# Patient Record
Sex: FEMALE | Race: WHITE | NOT HISPANIC OR LATINO
[De-identification: names, ages, dates, MRNs, and addresses within clinical notes are randomized per-mention and may not be internally consistent; named-entity substitution may affect disease eponyms.]

---

## 2017-01-11 ENCOUNTER — MEDICATION RENEWAL (OUTPATIENT)
Age: 37
End: 2017-01-11

## 2017-02-08 ENCOUNTER — APPOINTMENT (OUTPATIENT)
Dept: ENDOCRINOLOGY | Facility: CLINIC | Age: 37
End: 2017-02-08

## 2017-02-15 ENCOUNTER — RX RENEWAL (OUTPATIENT)
Age: 37
End: 2017-02-15

## 2017-03-16 ENCOUNTER — LABORATORY RESULT (OUTPATIENT)
Age: 37
End: 2017-03-16

## 2017-03-16 ENCOUNTER — APPOINTMENT (OUTPATIENT)
Dept: ENDOCRINOLOGY | Facility: CLINIC | Age: 37
End: 2017-03-16

## 2017-03-16 VITALS
HEART RATE: 80 BPM | DIASTOLIC BLOOD PRESSURE: 62 MMHG | WEIGHT: 138 LBS | BODY MASS INDEX: 25.4 KG/M2 | OXYGEN SATURATION: 97 % | HEIGHT: 62 IN | SYSTOLIC BLOOD PRESSURE: 102 MMHG

## 2017-03-16 LAB
GLUCOSE BLDC GLUCOMTR-MCNC: 155
HBA1C MFR BLD HPLC: 6.8

## 2017-03-20 LAB
25(OH)D3 SERPL-MCNC: 17.4 NG/ML
ALBUMIN SERPL ELPH-MCNC: 4.7 G/DL
ALP BLD-CCNC: 59 U/L
ALT SERPL-CCNC: 13 U/L
ANION GAP SERPL CALC-SCNC: 12 MMOL/L
AST SERPL-CCNC: 14 U/L
BASOPHILS # BLD AUTO: 0.13 K/UL
BASOPHILS NFR BLD AUTO: 1.9 %
BILIRUB SERPL-MCNC: 0.3 MG/DL
BUN SERPL-MCNC: 12 MG/DL
CALCIUM SERPL-MCNC: 9.4 MG/DL
CHLORIDE SERPL-SCNC: 101 MMOL/L
CHOLEST SERPL-MCNC: 220 MG/DL
CHOLEST/HDLC SERPL: 2.1 RATIO
CO2 SERPL-SCNC: 24 MMOL/L
CREAT SERPL-MCNC: 0.71 MG/DL
EOSINOPHIL # BLD AUTO: 0.15 K/UL
EOSINOPHIL NFR BLD AUTO: 2.2 %
GLUCOSE SERPL-MCNC: 170 MG/DL
HCT VFR BLD CALC: 31.7 %
HDLC SERPL-MCNC: 105 MG/DL
HGB BLD-MCNC: 9.1 G/DL
IMM GRANULOCYTES NFR BLD AUTO: 0.1 %
LDLC SERPL CALC-MCNC: 107 MG/DL
LYMPHOCYTES # BLD AUTO: 2.49 K/UL
LYMPHOCYTES NFR BLD AUTO: 36.7 %
MAN DIFF?: NORMAL
MCHC RBC-ENTMCNC: 20.8 PG
MCHC RBC-ENTMCNC: 28.7 GM/DL
MCV RBC AUTO: 72.4 FL
MONOCYTES # BLD AUTO: 0.76 K/UL
MONOCYTES NFR BLD AUTO: 11.2 %
NEUTROPHILS # BLD AUTO: 3.24 K/UL
NEUTROPHILS NFR BLD AUTO: 47.9 %
PLATELET # BLD AUTO: 319 K/UL
POTASSIUM SERPL-SCNC: 4.7 MMOL/L
PROT SERPL-MCNC: 6.9 G/DL
RBC # BLD: 4.38 M/UL
RBC # FLD: 15.3 %
SODIUM SERPL-SCNC: 137 MMOL/L
T4 FREE SERPL-MCNC: 1.6 NG/DL
TRIGL SERPL-MCNC: 41 MG/DL
TSH SERPL-ACNC: 0.72 UIU/ML
WBC # FLD AUTO: 6.78 K/UL

## 2017-05-19 ENCOUNTER — RX RENEWAL (OUTPATIENT)
Age: 37
End: 2017-05-19

## 2017-06-21 ENCOUNTER — RX RENEWAL (OUTPATIENT)
Age: 37
End: 2017-06-21

## 2017-06-30 ENCOUNTER — TRANSCRIPTION ENCOUNTER (OUTPATIENT)
Age: 37
End: 2017-06-30

## 2017-11-29 ENCOUNTER — RX RENEWAL (OUTPATIENT)
Age: 37
End: 2017-11-29

## 2017-12-13 ENCOUNTER — APPOINTMENT (OUTPATIENT)
Dept: ENDOCRINOLOGY | Facility: CLINIC | Age: 37
End: 2017-12-13

## 2018-04-25 ENCOUNTER — RESULT CHARGE (OUTPATIENT)
Age: 38
End: 2018-04-25

## 2018-04-25 ENCOUNTER — APPOINTMENT (OUTPATIENT)
Dept: ENDOCRINOLOGY | Facility: CLINIC | Age: 38
End: 2018-04-25
Payer: COMMERCIAL

## 2018-04-25 VITALS
SYSTOLIC BLOOD PRESSURE: 110 MMHG | WEIGHT: 138 LBS | HEART RATE: 73 BPM | BODY MASS INDEX: 25.4 KG/M2 | OXYGEN SATURATION: 98 % | HEIGHT: 62 IN | DIASTOLIC BLOOD PRESSURE: 70 MMHG

## 2018-04-25 LAB
BASOPHILS # BLD AUTO: 0.04 K/UL
BASOPHILS NFR BLD AUTO: 0.7 %
EOSINOPHIL # BLD AUTO: 0.1 K/UL
EOSINOPHIL NFR BLD AUTO: 1.7 %
GLUCOSE BLDC GLUCOMTR-MCNC: 198
HBA1C MFR BLD HPLC: 6.7
HCT VFR BLD CALC: 41.2 %
HGB BLD-MCNC: 13.4 G/DL
IMM GRANULOCYTES NFR BLD AUTO: 0.3 %
LYMPHOCYTES # BLD AUTO: 2.26 K/UL
LYMPHOCYTES NFR BLD AUTO: 37.7 %
MAN DIFF?: NORMAL
MCHC RBC-ENTMCNC: 28.8 PG
MCHC RBC-ENTMCNC: 32.5 GM/DL
MCV RBC AUTO: 88.6 FL
MONOCYTES # BLD AUTO: 0.51 K/UL
MONOCYTES NFR BLD AUTO: 8.5 %
NEUTROPHILS # BLD AUTO: 3.06 K/UL
NEUTROPHILS NFR BLD AUTO: 51.1 %
PLATELET # BLD AUTO: 295 K/UL
RBC # BLD: 4.65 M/UL
RBC # FLD: 12.7 %
WBC # FLD AUTO: 5.99 K/UL

## 2018-04-25 PROCEDURE — 95249 CONT GLUC MNTR PT PROV EQP: CPT

## 2018-04-25 PROCEDURE — 83036 HEMOGLOBIN GLYCOSYLATED A1C: CPT | Mod: QW

## 2018-04-25 PROCEDURE — 99214 OFFICE O/P EST MOD 30 MIN: CPT | Mod: 25

## 2018-04-25 RX ORDER — FLASH GLUCOSE SENSOR
KIT MISCELLANEOUS
Qty: 1 | Refills: 3 | Status: ACTIVE | COMMUNITY
Start: 2018-04-25 | End: 1900-01-01

## 2018-04-26 LAB
ALBUMIN SERPL ELPH-MCNC: 4.9 G/DL
ALP BLD-CCNC: 68 U/L
ALT SERPL-CCNC: 19 U/L
ANION GAP SERPL CALC-SCNC: 15 MMOL/L
AST SERPL-CCNC: 23 U/L
BILIRUB SERPL-MCNC: 0.5 MG/DL
BUN SERPL-MCNC: 14 MG/DL
CALCIUM SERPL-MCNC: 9.9 MG/DL
CHLORIDE SERPL-SCNC: 101 MMOL/L
CHOLEST SERPL-MCNC: 232 MG/DL
CHOLEST/HDLC SERPL: 2.7 RATIO
CO2 SERPL-SCNC: 26 MMOL/L
CREAT SERPL-MCNC: 0.82 MG/DL
CREAT SPEC-SCNC: 25 MG/DL
GLUCOSE SERPL-MCNC: 239 MG/DL
HDLC SERPL-MCNC: 87 MG/DL
IRON SERPL-MCNC: 70 UG/DL
LDLC SERPL CALC-MCNC: 135 MG/DL
MICROALBUMIN 24H UR DL<=1MG/L-MCNC: <0.3 MG/DL
MICROALBUMIN/CREAT 24H UR-RTO: NORMAL
POTASSIUM SERPL-SCNC: 4.8 MMOL/L
PROT SERPL-MCNC: 7.2 G/DL
SODIUM SERPL-SCNC: 142 MMOL/L
T4 FREE SERPL-MCNC: 2 NG/DL
TRIGL SERPL-MCNC: 48 MG/DL
TSH SERPL-ACNC: 0.22 UIU/ML

## 2018-04-26 RX ORDER — FLASH GLUCOSE SENSOR
KIT MISCELLANEOUS
Qty: 1 | Refills: 3 | Status: ACTIVE | COMMUNITY
Start: 2018-04-26 | End: 1900-01-01

## 2018-05-17 ENCOUNTER — APPOINTMENT (OUTPATIENT)
Dept: ENDOCRINOLOGY | Facility: CLINIC | Age: 38
End: 2018-05-17

## 2018-06-08 ENCOUNTER — RX RENEWAL (OUTPATIENT)
Age: 38
End: 2018-06-08

## 2018-06-15 ENCOUNTER — MEDICATION RENEWAL (OUTPATIENT)
Age: 38
End: 2018-06-15

## 2018-06-15 ENCOUNTER — APPOINTMENT (OUTPATIENT)
Dept: ENDOCRINOLOGY | Facility: CLINIC | Age: 38
End: 2018-06-15
Payer: COMMERCIAL

## 2018-06-15 PROCEDURE — 95251 CONT GLUC MNTR ANALYSIS I&R: CPT

## 2018-06-15 PROCEDURE — G0108 DIAB MANAGE TRN  PER INDIV: CPT

## 2018-08-24 ENCOUNTER — MEDICATION RENEWAL (OUTPATIENT)
Age: 38
End: 2018-08-24

## 2018-08-24 RX ORDER — DULAGLUTIDE 1.5 MG/.5ML
1.5 INJECTION, SOLUTION SUBCUTANEOUS
Qty: 1 | Refills: 3 | Status: DISCONTINUED | COMMUNITY
Start: 2018-06-15 | End: 2018-08-24

## 2018-08-27 ENCOUNTER — RX RENEWAL (OUTPATIENT)
Age: 38
End: 2018-08-27

## 2018-09-05 ENCOUNTER — RX RENEWAL (OUTPATIENT)
Age: 38
End: 2018-09-05

## 2018-09-06 ENCOUNTER — MEDICATION RENEWAL (OUTPATIENT)
Age: 38
End: 2018-09-06

## 2018-09-12 ENCOUNTER — APPOINTMENT (OUTPATIENT)
Dept: ENDOCRINOLOGY | Facility: CLINIC | Age: 38
End: 2018-09-12

## 2018-10-11 ENCOUNTER — TRANSCRIPTION ENCOUNTER (OUTPATIENT)
Age: 38
End: 2018-10-11

## 2018-10-16 ENCOUNTER — APPOINTMENT (OUTPATIENT)
Dept: ENDOCRINOLOGY | Facility: CLINIC | Age: 38
End: 2018-10-16

## 2018-11-20 ENCOUNTER — RX RENEWAL (OUTPATIENT)
Age: 38
End: 2018-11-20

## 2018-12-11 ENCOUNTER — RX RENEWAL (OUTPATIENT)
Age: 38
End: 2018-12-11

## 2019-02-13 ENCOUNTER — RX RENEWAL (OUTPATIENT)
Age: 39
End: 2019-02-13

## 2019-02-14 ENCOUNTER — MEDICATION RENEWAL (OUTPATIENT)
Age: 39
End: 2019-02-14

## 2019-04-12 ENCOUNTER — APPOINTMENT (OUTPATIENT)
Dept: ENDOCRINOLOGY | Facility: CLINIC | Age: 39
End: 2019-04-12

## 2019-06-12 ENCOUNTER — RX RENEWAL (OUTPATIENT)
Age: 39
End: 2019-06-12

## 2019-07-29 ENCOUNTER — APPOINTMENT (OUTPATIENT)
Dept: ENDOCRINOLOGY | Facility: CLINIC | Age: 39
End: 2019-07-29
Payer: COMMERCIAL

## 2019-07-29 VITALS
DIASTOLIC BLOOD PRESSURE: 80 MMHG | HEART RATE: 80 BPM | OXYGEN SATURATION: 98 % | BODY MASS INDEX: 22.45 KG/M2 | WEIGHT: 122 LBS | SYSTOLIC BLOOD PRESSURE: 112 MMHG | HEIGHT: 62 IN

## 2019-07-29 PROCEDURE — 99214 OFFICE O/P EST MOD 30 MIN: CPT

## 2019-07-30 ENCOUNTER — RX RENEWAL (OUTPATIENT)
Age: 39
End: 2019-07-30

## 2019-07-30 ENCOUNTER — TRANSCRIPTION ENCOUNTER (OUTPATIENT)
Age: 39
End: 2019-07-30

## 2019-07-30 LAB
25(OH)D3 SERPL-MCNC: 20.4 NG/ML
ALBUMIN SERPL ELPH-MCNC: 4.6 G/DL
ALP BLD-CCNC: 59 U/L
ALT SERPL-CCNC: 17 U/L
ANION GAP SERPL CALC-SCNC: 11 MMOL/L
AST SERPL-CCNC: 16 U/L
BILIRUB SERPL-MCNC: 0.5 MG/DL
BUN SERPL-MCNC: 10 MG/DL
CALCIUM SERPL-MCNC: 9.6 MG/DL
CHLORIDE SERPL-SCNC: 104 MMOL/L
CHOLEST SERPL-MCNC: 211 MG/DL
CHOLEST/HDLC SERPL: 2.5 RATIO
CO2 SERPL-SCNC: 27 MMOL/L
CREAT SERPL-MCNC: 0.7 MG/DL
CREAT SPEC-SCNC: 17 MG/DL
GLUCOSE SERPL-MCNC: 132 MG/DL
HBA1C MFR BLD HPLC: 6
HDLC SERPL-MCNC: 83 MG/DL
LDLC SERPL CALC-MCNC: 116 MG/DL
MICROALBUMIN 24H UR DL<=1MG/L-MCNC: <1.2 MG/DL
MICROALBUMIN/CREAT 24H UR-RTO: NORMAL MG/G
POTASSIUM SERPL-SCNC: 4.7 MMOL/L
PROT SERPL-MCNC: 6.7 G/DL
SODIUM SERPL-SCNC: 142 MMOL/L
T4 FREE SERPL-MCNC: 1.7 NG/DL
TRIGL SERPL-MCNC: 59 MG/DL
TSH SERPL-ACNC: 0.09 UIU/ML
TTG IGA SER IA-ACNC: <1.2 U/ML
TTG IGA SER-ACNC: NEGATIVE
TTG IGG SER IA-ACNC: <1.2 U/ML
TTG IGG SER IA-ACNC: NEGATIVE

## 2019-08-06 ENCOUNTER — RX RENEWAL (OUTPATIENT)
Age: 39
End: 2019-08-06

## 2019-08-06 NOTE — REVIEW OF SYSTEMS
[Negative] : Endocrine [Recent Weight Loss (___ Lbs)] : recent [unfilled] ~Ulb weight loss [Fatigue] : no fatigue [Decreased Appetite] : appetite not decreased [Recent Weight Gain (___ Lbs)] : no recent weight gain [Blurry Vision] : no blurred vision [Eye Pain] : no eye pain [Dysphagia] : no dysphagia [Dysphonia] : no dysphonia [Chest Pain] : no chest pain [Palpitations] : no palpitations [Lower Ext Edema] : no lower extremity edema

## 2019-08-06 NOTE — PHYSICAL EXAM
[Alert] : alert [No Acute Distress] : no acute distress [EOMI] : extra ocular movement intact [Thyroid Not Enlarged] : the thyroid was not enlarged [Clear to Auscultation] : lungs were clear to auscultation bilaterally [Normal S1, S2] : normal S1 and S2 [Regular Rhythm] : with a regular rhythm [Pedal Pulses Normal] : the pedal pulses are present [No Edema] : there was no peripheral edema [Normal Bowel Sounds] : normal bowel sounds [Not Tender] : non-tender [No Spinal Tenderness] : no spinal tenderness [Normal Gait] : normal gait [Normal Sensation on Monofilament Testing] : normal sensation on monofilament testing of lower extremities [Normal Affect] : the affect was normal [No Neck Mass] : no neck mass was observed [Supple] : the neck was supple [No Respiratory Distress] : no respiratory distress [Normal Rate and Effort] : normal respiratory rhythm and effort [Normal Rate] : heart rate was normal  [Anterior Cervical Nodes] : anterior cervical nodes [Right Foot Was Examined] : right foot ~C was examined [Normal] : normal and non tender [Left Foot Was Examined] : left foot ~C was examined [Oriented x3] : oriented to person, place, and time [Normal Insight/Judgement] : insight and judgment were intact [Cranial Nerves Intact] : cranial nerves 2-12 were intact [Foot Ulcers] : no foot ulcers

## 2019-08-06 NOTE — ASSESSMENT
[Carbohydrate Consistent Diet] : carbohydrate consistent diet [Hypoglycemia Management] : hypoglycemia management [Glucagon Use] : glucagon use [Ketone Testing] : ketone testing [Diabetes Foot Care] : diabetes foot care [Long Term Vascular Complications] : long term vascular complications of diabetes [Importance of Diet and Exercise] : importance of diet and exercise to improve glycemic control, achieve weight loss and improve cardiovascular health [Levothyroxine] : The patient was instructed to take Levothyroxine on an empty stomach, separate from vitamins, and wait at least 30 minutes before eating [Self Monitoring of Blood Glucose] : self monitoring of blood glucose [Retinopathy Screening] : Patient was referred to ophthalmology for retinopathy screening [FreeTextEntry1] : 39-year-old female with poorly controlled type 1 diabetes on omnipod pump and hypothyroidism\par \par Type 1 diabetes: Hemoglobin A1c today was 6.0%\par As per records, noted with hypoglycemia after meals. Will decrease ICR from 1:14 to 1:15\par Pt does not have sensor currently, but encouraged to meet with CDE for education on sensors- DEXCOM G6. Pt agreeable.\par I went over treatment of low blood sugar need for medical or bracelet\par prescribed ketone strips, glucagon, and back-up basal insulin\par \par Hyperlipidemia: check lipid panel\par \par Hypertension: Her blood pressure is normal check for protein in the urine\par \par Hypothyroidism: check tfts continue current dosage of levothyroxine for now 137mcg and adjust as indicated\par \par HCM\par check 25 vitamin D\par screen for celiac\par \par Followup in 3 months\par \par D/W Dr. Tuttle

## 2019-08-06 NOTE — HISTORY OF PRESENT ILLNESS
[FreeTextEntry1] : 39-year-old female for follow up of type 1 diabetes and hypothyroidism\par \par Used to follow with Dr. Sellers and last seen in April 2018.\par She was diagnosed when she was 16 years old with Type 1 DM. Also has a hx of AVM and seizures she says avm has gone away and seizure meds will be tapered. She was treated with Gamma knife radiation that helped her. Following the neurology and on anti-sz meds.\par \par She uses an insulin pump omnipod (with novolog) and does not like the sensor too much beeping. And does like 2nd device. Uses yecenia on/off.\par SMBG 5-6X/day. As per logs, appears to having hypos to 50s after meals mostly. Several BG in 200s-300s, most in good range.\par Changes site every 2 days otherwise has clogs on third day. \par Usually carful with diet, carb counts, limits carb intake.\par use temp basal 50% when works out. \par Last optho visit 2 weeks ago, no retinopathy. No neuropathy. Microalb/crt neg in April 2018.\par No ketone strips, no glucagon. No basal insulin\par \par also with a longstanding hx of hypothyroidism takes levothy 137 mcg every day on empty stomach. was previously on 150mcg but was decreased last year for suppressed TSH and Ft4 of 2.0 in Aug 2018. No repeat BW since. Denies any neck complaints, hyper/hypothyroid symptoms.\par \par Used to be on ozembiq for weight loss- was on for 2-3 mos. Reports lost 20lbs and has been off it since Feb 2019.\par \par \par

## 2019-08-06 NOTE — DATA REVIEWED
[FreeTextEntry1] : Omnipod: \par TDD Basal: 14.1\par Basal: 12am-6am 0.550\par 6AM-12AM 0.600\par \par ICR: 12am-12am 1:14\par ISF: 12am-12am 100\par BGT 12am-12am 120\par

## 2019-09-03 ENCOUNTER — RX RENEWAL (OUTPATIENT)
Age: 39
End: 2019-09-03

## 2019-10-07 ENCOUNTER — RX RENEWAL (OUTPATIENT)
Age: 39
End: 2019-10-07

## 2019-10-29 ENCOUNTER — EMERGENCY (EMERGENCY)
Facility: HOSPITAL | Age: 39
LOS: 1 days | Discharge: HOME | End: 2019-10-29
Attending: EMERGENCY MEDICINE
Payer: COMMERCIAL

## 2019-10-29 VITALS
HEART RATE: 94 BPM | OXYGEN SATURATION: 100 % | SYSTOLIC BLOOD PRESSURE: 114 MMHG | DIASTOLIC BLOOD PRESSURE: 69 MMHG | RESPIRATION RATE: 16 BRPM

## 2019-10-29 VITALS
HEART RATE: 99 BPM | TEMPERATURE: 99 F | DIASTOLIC BLOOD PRESSURE: 72 MMHG | RESPIRATION RATE: 17 BRPM | OXYGEN SATURATION: 100 % | SYSTOLIC BLOOD PRESSURE: 125 MMHG

## 2019-10-29 DIAGNOSIS — Z92.3 PERSONAL HISTORY OF IRRADIATION: Chronic | ICD-10-CM

## 2019-10-29 LAB
ALBUMIN SERPL ELPH-MCNC: 4.8 G/DL — SIGNIFICANT CHANGE UP (ref 3.3–5)
ALP SERPL-CCNC: 80 U/L — SIGNIFICANT CHANGE UP (ref 40–120)
ALT FLD-CCNC: 61 U/L — HIGH (ref 10–45)
ANION GAP SERPL CALC-SCNC: 16 MMOL/L — SIGNIFICANT CHANGE UP (ref 5–17)
AST SERPL-CCNC: 33 U/L — SIGNIFICANT CHANGE UP (ref 10–40)
BASE EXCESS BLDV CALC-SCNC: -2.2 MMOL/L — LOW (ref -2–2)
BASOPHILS # BLD AUTO: 0.05 K/UL — SIGNIFICANT CHANGE UP (ref 0–0.2)
BASOPHILS NFR BLD AUTO: 0.3 % — SIGNIFICANT CHANGE UP (ref 0–2)
BILIRUB SERPL-MCNC: 0.9 MG/DL — SIGNIFICANT CHANGE UP (ref 0.2–1.2)
BUN SERPL-MCNC: 15 MG/DL — SIGNIFICANT CHANGE UP (ref 7–23)
CALCIUM SERPL-MCNC: 10.4 MG/DL — SIGNIFICANT CHANGE UP (ref 8.4–10.5)
CHLORIDE SERPL-SCNC: 96 MMOL/L — SIGNIFICANT CHANGE UP (ref 96–108)
CO2 BLDV-SCNC: 26 MMOL/L — SIGNIFICANT CHANGE UP (ref 22–30)
CO2 SERPL-SCNC: 22 MMOL/L — SIGNIFICANT CHANGE UP (ref 22–31)
CREAT SERPL-MCNC: 0.67 MG/DL — SIGNIFICANT CHANGE UP (ref 0.5–1.3)
EOSINOPHIL # BLD AUTO: 0.01 K/UL — SIGNIFICANT CHANGE UP (ref 0–0.5)
EOSINOPHIL NFR BLD AUTO: 0.1 % — SIGNIFICANT CHANGE UP (ref 0–6)
FLU A RESULT: SIGNIFICANT CHANGE UP
FLU A RESULT: SIGNIFICANT CHANGE UP
FLUAV AG NPH QL: SIGNIFICANT CHANGE UP
FLUBV AG NPH QL: SIGNIFICANT CHANGE UP
GAS PNL BLDV: SIGNIFICANT CHANGE UP
GAS PNL BLDV: SIGNIFICANT CHANGE UP
GLUCOSE SERPL-MCNC: 351 MG/DL — HIGH (ref 70–99)
HCO3 BLDV-SCNC: 24 MMOL/L — SIGNIFICANT CHANGE UP (ref 21–29)
HCT VFR BLD CALC: 40.4 % — SIGNIFICANT CHANGE UP (ref 34.5–45)
HGB BLD-MCNC: 13.7 G/DL — SIGNIFICANT CHANGE UP (ref 11.5–15.5)
IMM GRANULOCYTES NFR BLD AUTO: 0.5 % — SIGNIFICANT CHANGE UP (ref 0–1.5)
LACTATE BLDV-MCNC: 3 MMOL/L — HIGH (ref 0.7–2)
LIDOCAIN IGE QN: 13 U/L — SIGNIFICANT CHANGE UP (ref 7–60)
LYMPHOCYTES # BLD AUTO: 0.95 K/UL — LOW (ref 1–3.3)
LYMPHOCYTES # BLD AUTO: 6.5 % — LOW (ref 13–44)
MCHC RBC-ENTMCNC: 28.5 PG — SIGNIFICANT CHANGE UP (ref 27–34)
MCHC RBC-ENTMCNC: 33.9 GM/DL — SIGNIFICANT CHANGE UP (ref 32–36)
MCV RBC AUTO: 84 FL — SIGNIFICANT CHANGE UP (ref 80–100)
MONOCYTES # BLD AUTO: 1.03 K/UL — HIGH (ref 0–0.9)
MONOCYTES NFR BLD AUTO: 7.1 % — SIGNIFICANT CHANGE UP (ref 2–14)
NEUTROPHILS # BLD AUTO: 12.44 K/UL — HIGH (ref 1.8–7.4)
NEUTROPHILS NFR BLD AUTO: 85.5 % — HIGH (ref 43–77)
NRBC # BLD: 0 /100 WBCS — SIGNIFICANT CHANGE UP (ref 0–0)
PCO2 BLDV: 50 MMHG — SIGNIFICANT CHANGE UP (ref 35–50)
PH BLDV: 7.3 — LOW (ref 7.35–7.45)
PLATELET # BLD AUTO: 304 K/UL — SIGNIFICANT CHANGE UP (ref 150–400)
PO2 BLDV: 27 MMHG — SIGNIFICANT CHANGE UP (ref 25–45)
POTASSIUM SERPL-MCNC: 4.3 MMOL/L — SIGNIFICANT CHANGE UP (ref 3.5–5.3)
POTASSIUM SERPL-SCNC: 4.3 MMOL/L — SIGNIFICANT CHANGE UP (ref 3.5–5.3)
PROT SERPL-MCNC: 7.7 G/DL — SIGNIFICANT CHANGE UP (ref 6–8.3)
RBC # BLD: 4.81 M/UL — SIGNIFICANT CHANGE UP (ref 3.8–5.2)
RBC # FLD: 11.6 % — SIGNIFICANT CHANGE UP (ref 10.3–14.5)
RSV RESULT: SIGNIFICANT CHANGE UP
RSV RNA RESP QL NAA+PROBE: SIGNIFICANT CHANGE UP
SAO2 % BLDV: 41 % — LOW (ref 67–88)
SODIUM SERPL-SCNC: 134 MMOL/L — LOW (ref 135–145)
TROPONIN T, HIGH SENSITIVITY RESULT: <6 NG/L — SIGNIFICANT CHANGE UP (ref 0–51)
WBC # BLD: 14.55 K/UL — HIGH (ref 3.8–10.5)
WBC # FLD AUTO: 14.55 K/UL — HIGH (ref 3.8–10.5)

## 2019-10-29 PROCEDURE — 87631 RESP VIRUS 3-5 TARGETS: CPT

## 2019-10-29 PROCEDURE — 99285 EMERGENCY DEPT VISIT HI MDM: CPT

## 2019-10-29 PROCEDURE — 82947 ASSAY GLUCOSE BLOOD QUANT: CPT

## 2019-10-29 PROCEDURE — 82330 ASSAY OF CALCIUM: CPT

## 2019-10-29 PROCEDURE — 82435 ASSAY OF BLOOD CHLORIDE: CPT

## 2019-10-29 PROCEDURE — 83605 ASSAY OF LACTIC ACID: CPT

## 2019-10-29 PROCEDURE — 96375 TX/PRO/DX INJ NEW DRUG ADDON: CPT

## 2019-10-29 PROCEDURE — 71046 X-RAY EXAM CHEST 2 VIEWS: CPT

## 2019-10-29 PROCEDURE — 85027 COMPLETE CBC AUTOMATED: CPT

## 2019-10-29 PROCEDURE — 83690 ASSAY OF LIPASE: CPT

## 2019-10-29 PROCEDURE — 84484 ASSAY OF TROPONIN QUANT: CPT

## 2019-10-29 PROCEDURE — 82962 GLUCOSE BLOOD TEST: CPT

## 2019-10-29 PROCEDURE — 71046 X-RAY EXAM CHEST 2 VIEWS: CPT | Mod: 26

## 2019-10-29 PROCEDURE — 93005 ELECTROCARDIOGRAM TRACING: CPT

## 2019-10-29 PROCEDURE — 93010 ELECTROCARDIOGRAM REPORT: CPT

## 2019-10-29 PROCEDURE — 96374 THER/PROPH/DIAG INJ IV PUSH: CPT

## 2019-10-29 PROCEDURE — 82803 BLOOD GASES ANY COMBINATION: CPT

## 2019-10-29 PROCEDURE — 80053 COMPREHEN METABOLIC PANEL: CPT

## 2019-10-29 PROCEDURE — 85014 HEMATOCRIT: CPT

## 2019-10-29 PROCEDURE — 84295 ASSAY OF SERUM SODIUM: CPT

## 2019-10-29 PROCEDURE — 84132 ASSAY OF SERUM POTASSIUM: CPT

## 2019-10-29 PROCEDURE — 99284 EMERGENCY DEPT VISIT MOD MDM: CPT | Mod: 25

## 2019-10-29 RX ORDER — INSULIN GLARGINE 100 [IU]/ML
7 INJECTION, SOLUTION SUBCUTANEOUS ONCE
Refills: 0 | Status: COMPLETED | OUTPATIENT
Start: 2019-10-29 | End: 2019-10-29

## 2019-10-29 RX ORDER — ONDANSETRON 8 MG/1
4 TABLET, FILM COATED ORAL ONCE
Refills: 0 | Status: COMPLETED | OUTPATIENT
Start: 2019-10-29 | End: 2019-10-29

## 2019-10-29 RX ORDER — ONDANSETRON 8 MG/1
1 TABLET, FILM COATED ORAL
Qty: 12 | Refills: 0
Start: 2019-10-29 | End: 2019-11-01

## 2019-10-29 RX ORDER — FAMOTIDINE 10 MG/ML
20 INJECTION INTRAVENOUS ONCE
Refills: 0 | Status: COMPLETED | OUTPATIENT
Start: 2019-10-29 | End: 2019-10-29

## 2019-10-29 RX ORDER — SODIUM CHLORIDE 9 MG/ML
1000 INJECTION INTRAMUSCULAR; INTRAVENOUS; SUBCUTANEOUS ONCE
Refills: 0 | Status: COMPLETED | OUTPATIENT
Start: 2019-10-29 | End: 2019-10-29

## 2019-10-29 RX ORDER — LIDOCAINE 4 G/100G
10 CREAM TOPICAL ONCE
Refills: 0 | Status: COMPLETED | OUTPATIENT
Start: 2019-10-29 | End: 2019-10-29

## 2019-10-29 RX ORDER — INSULIN LISPRO 100/ML
4 VIAL (ML) SUBCUTANEOUS ONCE
Refills: 0 | Status: COMPLETED | OUTPATIENT
Start: 2019-10-29 | End: 2019-10-29

## 2019-10-29 RX ADMIN — ONDANSETRON 4 MILLIGRAM(S): 8 TABLET, FILM COATED ORAL at 19:28

## 2019-10-29 RX ADMIN — FAMOTIDINE 20 MILLIGRAM(S): 10 INJECTION INTRAVENOUS at 19:28

## 2019-10-29 RX ADMIN — SODIUM CHLORIDE 1000 MILLILITER(S): 9 INJECTION INTRAMUSCULAR; INTRAVENOUS; SUBCUTANEOUS at 19:33

## 2019-10-29 RX ADMIN — Medication 4 UNIT(S): at 19:40

## 2019-10-29 RX ADMIN — INSULIN GLARGINE 7 UNIT(S): 100 INJECTION, SOLUTION SUBCUTANEOUS at 19:43

## 2019-10-29 RX ADMIN — ONDANSETRON 4 MILLIGRAM(S): 8 TABLET, FILM COATED ORAL at 23:02

## 2019-10-29 RX ADMIN — Medication 30 MILLILITER(S): at 19:29

## 2019-10-29 RX ADMIN — LIDOCAINE 10 MILLILITER(S): 4 CREAM TOPICAL at 19:29

## 2019-10-29 NOTE — ED PROVIDER NOTE - CLINICAL SUMMARY MEDICAL DECISION MAKING FREE TEXT BOX
39F w/ hx AVM s/p gamma knife, seizures, T1DM on insulin pump, hypothyroidism, HLD p/w chest burning, fevers, n/v, sore throat, chest burning and heaviness since this AM. Most likely has viral syndrome. Also concerning for early DKA with elevated BG. WIll obtain cbc, cmp, lipase, vbg, UA, trop, ekg, cxr, A1c. Pt's pump currently off. Will give basal insuline and sliding scale coverage. will try symptom relief with pepcid, maalox, zofran, viscous lido. EKG with TWI in inferior leads, concerning for ACS, so will get trop and will likely need to stay for stress. Dr. Calvert (Attending Physician)  39F w/ hx AVM s/p gamma knife, seizures, T1DM on insulin pump, hypothyroidism, HLD p/w chest burning, fevers, n/v, sore throat, chest burning and heaviness since this AM. Most likely has viral syndrome. Also concerning for early DKA with elevated BG. WIll obtain cbc, cmp, lipase, vbg, UA, trop, ekg, cxr, A1c. Pt's pump currently off. Will give basal insuline and sliding scale coverage. will try symptom relief with pepcid, maalox, zofran, viscous lido. EKG with TWI in inferior leads, concerning for ACS, so will get trop and reassess. PERC negative.

## 2019-10-29 NOTE — ED PROVIDER NOTE - NSFOLLOWUPINSTRUCTIONS_ED_ALL_ED_FT
Take pepcid 20 mg twice daily and maalox as needed for burning chest pain. Follow-up with cardiology in the next 72 hours. Return to ED for worsening symptoms or for any worrisome concerns.

## 2019-10-29 NOTE — ED PROVIDER NOTE - OBJECTIVE STATEMENT
39F w/ hx AVM s/p gamma knife, seizures, T1DM on insulin pump, hypothyroidism, HLD p/w chest burning, fevers, n/v, and "feeling crummy" since this AM. Pt had sore throat initially and felt like she was coming down with something. Went to  and had a rapid strep that was negative. Then began having chest discomfort, N/V, and then came here. Pt took 2U reg insulin before coming today and took off her pump. Notes glucose has been going up since taking pump off. FSBG 300+ here. LMP 2 wks ago. Symptoms associated with chest heaviness. No fam hx of early cardiac death or CAD.   Meds: synthroid 125mcg, keppra 2000mg BID, onfi BID, lamictal, crestor?, novalog  Allerg: none  Surg: several cranial surg, 2 c-sections  Soc: never smoker, drinker, drugs, works as , lives with  and kids  PMD: none  Endo: [facility] Del Valle, Dr. Quintero

## 2019-10-29 NOTE — ED PROVIDER NOTE - PROGRESS NOTE DETAILS
Lisette SANTACRUZ: Pt with mild acidosis on VBG 7.30 with no anion gap. Lipase wnl. Trop <6. Lactate 3.0. WBC 14. CXR appears clear without infiltrate. rapid flu pending. FSBG going down 30 mins after humalog and lantus given. will continue to trend. Dr. Calvert (Attending Physician)  Pt improved after taking gi cocktail.  likely dyspepsia, heart score 2 will give follow-up with cardiology and gastroenterology. Advised to take pepcid, maalox, zofran if symptoms return.

## 2019-10-29 NOTE — ED PROVIDER NOTE - PHYSICAL EXAMINATION
General: WN/WD NAD  HEENT: PERRLA, EOMI, moist mucous membranes  Neurology: A&Ox3, nonfocal, MCNAIR x 4  Respiratory: CTA B/L, normal respiratory effort, no wheezes, crackles, rales  CV: RRR, S1S2, no murmurs, rubs or gallops  Abdominal: Soft, NT, ND +BS, Last BM  Extremities: No edema, + peripheral pulses

## 2019-10-29 NOTE — ED PROVIDER NOTE - ATTENDING CONTRIBUTION TO CARE
Dr. Calvert (Attending Physician)  I performed a history and physical exam of the patient and discussed their management with the resident. I reviewed the resident's note and agree with the documented findings and plan of care. My medical decision making and observations are found above.

## 2019-10-29 NOTE — ED PROVIDER NOTE - PATIENT PORTAL LINK FT
You can access the FollowMyHealth Patient Portal offered by [facility] by registering at the following website: [email or web address]. By joining [employer]’s FollowMyHealth portal, you will also be able to view your health information using other applications (apps) compatible with our system.

## 2019-10-29 NOTE — ED ADULT NURSE NOTE - OBJECTIVE STATEMENT
38 y/o female history of AVM, Diabetes Type 1, and hypothyroidism presents to the ED from home c/o burning in the chest and elevated blood sugar. Patient states that for the past two weeks her daughter has had strep throat. Today, she noticed a burning in her throat and her blood sugar started to become elevated.  Patient went to urgent care and was swabbed negative for the flu. Patient states that she has also felt nausea and chills.  Denies fever, vomiting, weakness, abd pain, diarrhea/constipation, numbness/tingling, urinary s/s. Patient A&Ox3, in no respiratory distress, and denies chest pain. Strong peripheral pulses. Blood sugar 312 in ED. NSR on CM. Abdomen soft and non distended. Non tender upon palpation.

## 2019-10-29 NOTE — ED PROVIDER NOTE - NSFOLLOWUPCLINICS_GEN_ALL_ED_FT
[facility]  Cardiology  [address] [place] [address]  Phone: [phone]  Fax:   Follow Up Time: 1-3 Days    [facility]  Gastroenterology  [address] [place] [id number]  Phone: [phone]  Fax:   Follow Up Time: 7-10 Days

## 2019-10-30 ENCOUNTER — TRANSCRIPTION ENCOUNTER (OUTPATIENT)
Age: 39
End: 2019-10-30

## 2020-02-21 PROBLEM — Q27.30 ARTERIOVENOUS MALFORMATION, SITE UNSPECIFIED: Chronic | Status: ACTIVE | Noted: 2019-10-29

## 2020-02-21 PROBLEM — E03.9 HYPOTHYROIDISM, UNSPECIFIED: Chronic | Status: ACTIVE | Noted: 2019-10-29

## 2020-02-21 PROBLEM — E10.9 TYPE 1 DIABETES MELLITUS WITHOUT COMPLICATIONS: Chronic | Status: ACTIVE | Noted: 2019-10-29

## 2020-05-18 ENCOUNTER — APPOINTMENT (OUTPATIENT)
Dept: ENDOCRINOLOGY | Facility: CLINIC | Age: 40
End: 2020-05-18

## 2020-06-05 ENCOUNTER — RX RENEWAL (OUTPATIENT)
Age: 40
End: 2020-06-05

## 2020-07-07 ENCOUNTER — APPOINTMENT (OUTPATIENT)
Dept: ENDOCRINOLOGY | Facility: CLINIC | Age: 40
End: 2020-07-07
Payer: COMMERCIAL

## 2020-07-07 PROCEDURE — 83036 HEMOGLOBIN GLYCOSYLATED A1C: CPT | Mod: QW

## 2020-07-07 PROCEDURE — G0108 DIAB MANAGE TRN  PER INDIV: CPT

## 2020-07-09 LAB
25(OH)D3 SERPL-MCNC: 23.4 NG/ML
ALBUMIN SERPL ELPH-MCNC: 5.2 G/DL
ALP BLD-CCNC: 67 U/L
ALT SERPL-CCNC: 30 U/L
ANION GAP SERPL CALC-SCNC: 12 MMOL/L
AST SERPL-CCNC: 31 U/L
BILIRUB SERPL-MCNC: 0.5 MG/DL
BUN SERPL-MCNC: 10 MG/DL
CALCIUM SERPL-MCNC: 10.1 MG/DL
CHLORIDE SERPL-SCNC: 102 MMOL/L
CHOLEST SERPL-MCNC: 181 MG/DL
CHOLEST/HDLC SERPL: 1.8 RATIO
CO2 SERPL-SCNC: 28 MMOL/L
CREAT SERPL-MCNC: 0.85 MG/DL
CREAT SPEC-SCNC: 26 MG/DL
GLUCOSE SERPL-MCNC: 118 MG/DL
HBA1C MFR BLD HPLC: 5.8
HDLC SERPL-MCNC: 98 MG/DL
LDLC SERPL CALC-MCNC: 75 MG/DL
MICROALBUMIN 24H UR DL<=1MG/L-MCNC: <1.2 MG/DL
MICROALBUMIN/CREAT 24H UR-RTO: NORMAL MG/G
POTASSIUM SERPL-SCNC: 5.1 MMOL/L
PROT SERPL-MCNC: 7.2 G/DL
SODIUM SERPL-SCNC: 142 MMOL/L
TRIGL SERPL-MCNC: 40 MG/DL
TSH SERPL-ACNC: 0.4 UIU/ML

## 2020-08-03 ENCOUNTER — RX RENEWAL (OUTPATIENT)
Age: 40
End: 2020-08-03

## 2020-08-31 ENCOUNTER — APPOINTMENT (OUTPATIENT)
Dept: ENDOCRINOLOGY | Facility: CLINIC | Age: 40
End: 2020-08-31
Payer: COMMERCIAL

## 2020-08-31 VITALS
OXYGEN SATURATION: 98 % | HEIGHT: 62 IN | SYSTOLIC BLOOD PRESSURE: 110 MMHG | HEART RATE: 80 BPM | DIASTOLIC BLOOD PRESSURE: 80 MMHG | TEMPERATURE: 98.5 F

## 2020-08-31 DIAGNOSIS — R63.5 ABNORMAL WEIGHT GAIN: ICD-10-CM

## 2020-08-31 DIAGNOSIS — Z23 ENCOUNTER FOR IMMUNIZATION: ICD-10-CM

## 2020-08-31 PROCEDURE — 99214 OFFICE O/P EST MOD 30 MIN: CPT | Mod: 25

## 2020-08-31 PROCEDURE — G0008: CPT

## 2020-08-31 PROCEDURE — 90686 IIV4 VACC NO PRSV 0.5 ML IM: CPT

## 2020-08-31 NOTE — THERAPY
[Today's Date] : [unfilled] [Novolog] : Novolog [_____] : Start Time: [unfilled]     BG Target Ranges: [Insulin on Board (IOB) Duration = ____ hours] : Insulin on Board (IOB) Duration  = [unfilled] hours

## 2020-08-31 NOTE — ASSESSMENT
[Diabetes Foot Care] : diabetes foot care [Long Term Vascular Complications] : long term vascular complications of diabetes [Carbohydrate Consistent Diet] : carbohydrate consistent diet [Importance of Diet and Exercise] : importance of diet and exercise to improve glycemic control, achieve weight loss and improve cardiovascular health [Exercise/Effect on Glucose] : exercise/effect on glucose [Hypoglycemia Management] : hypoglycemia management [Glucagon Use] : glucagon use [Ketone Testing] : ketone testing [Action and use of Insulin] : action and use of short and long-acting insulin [Self Monitoring of Blood Glucose] : self monitoring of blood glucose [Insulin Self-Administration] : insulin self-administration [Injection Technique, Storage, Sharps Disposal] : injection technique, storage, and sharps disposal [Sick-Day Management] : sick-day management [Retinopathy Screening] : Patient was referred to ophthalmology for retinopathy screening [FreeTextEntry1] : 40-year-old female with poorly controlled type 1 diabetes on omnipod pump and hypothyroidism\par \par #1 Type 1 diabetes: Hemoglobin A1c today was 5.8%\par Continue with current settings as above.  Discussed hypoglycemic protocol and unhypoglycemic unawareness. \par Willing to consider DEXCOM G6\par Will be resuming Dg 14 days. \par \par #2 Hyperlipidemia: check lipid panel\par \par #3 Hypertension: Her blood pressure is normal check for protein in the urine\par \par #4 Hypothyroidism: check tfts continue current dosage of levothyroxine for now 125mcg and adjust as indicated\par \par #5 Vitamin D deficiency Continue with Vitamin D supplements\par \par Flu vaccine given today.

## 2020-12-01 ENCOUNTER — RX RENEWAL (OUTPATIENT)
Age: 40
End: 2020-12-01

## 2020-12-18 ENCOUNTER — RX RENEWAL (OUTPATIENT)
Age: 40
End: 2020-12-18

## 2021-01-15 ENCOUNTER — FORM ENCOUNTER (OUTPATIENT)
Age: 41
End: 2021-01-15

## 2021-01-16 ENCOUNTER — TRANSCRIPTION ENCOUNTER (OUTPATIENT)
Age: 41
End: 2021-01-16

## 2021-01-17 ENCOUNTER — NON-APPOINTMENT (OUTPATIENT)
Age: 41
End: 2021-01-17

## 2021-01-19 ENCOUNTER — APPOINTMENT (OUTPATIENT)
Dept: DISASTER EMERGENCY | Facility: HOSPITAL | Age: 41
End: 2021-01-19

## 2021-01-20 ENCOUNTER — APPOINTMENT (OUTPATIENT)
Dept: CARE COORDINATION | Facility: HOME HEALTH | Age: 41
End: 2021-01-20
Payer: COMMERCIAL

## 2021-01-20 ENCOUNTER — TRANSCRIPTION ENCOUNTER (OUTPATIENT)
Age: 41
End: 2021-01-20

## 2021-01-20 DIAGNOSIS — M79.2 NEURALGIA AND NEURITIS, UNSPECIFIED: ICD-10-CM

## 2021-01-20 DIAGNOSIS — U07.1 COVID-19: ICD-10-CM

## 2021-01-20 PROCEDURE — 99348 HOME/RES VST EST LOW MDM 30: CPT | Mod: 95

## 2021-01-20 RX ORDER — CAPSAICIN 0.1 G/100G
0.1 CREAM TOPICAL 3 TIMES DAILY
Qty: 1 | Refills: 0 | Status: ACTIVE | COMMUNITY
Start: 2021-01-20 | End: 1900-01-01

## 2021-01-21 PROBLEM — M79.2 NEUROPATHIC PAIN: Status: ACTIVE | Noted: 2021-01-20

## 2021-01-21 PROBLEM — U07.1 COVID-19: Status: ACTIVE | Noted: 2021-01-21

## 2021-01-22 ENCOUNTER — TRANSCRIPTION ENCOUNTER (OUTPATIENT)
Age: 41
End: 2021-01-22

## 2021-02-01 ENCOUNTER — RX RENEWAL (OUTPATIENT)
Age: 41
End: 2021-02-01

## 2021-02-02 ENCOUNTER — TRANSCRIPTION ENCOUNTER (OUTPATIENT)
Age: 41
End: 2021-02-02

## 2021-03-20 ENCOUNTER — TRANSCRIPTION ENCOUNTER (OUTPATIENT)
Age: 41
End: 2021-03-20

## 2021-04-18 ENCOUNTER — RX RENEWAL (OUTPATIENT)
Age: 41
End: 2021-04-18

## 2021-04-19 ENCOUNTER — RX RENEWAL (OUTPATIENT)
Age: 41
End: 2021-04-19

## 2021-05-04 ENCOUNTER — RX RENEWAL (OUTPATIENT)
Age: 41
End: 2021-05-04

## 2021-07-16 RX ORDER — INSULIN GLARGINE 100 [IU]/ML
100 INJECTION, SOLUTION SUBCUTANEOUS
Qty: 1 | Refills: 0 | Status: ACTIVE | COMMUNITY
Start: 2021-07-16

## 2021-08-02 ENCOUNTER — RX RENEWAL (OUTPATIENT)
Age: 41
End: 2021-08-02

## 2021-09-01 ENCOUNTER — RX RENEWAL (OUTPATIENT)
Age: 41
End: 2021-09-01

## 2021-11-03 ENCOUNTER — RX RENEWAL (OUTPATIENT)
Age: 41
End: 2021-11-03

## 2021-11-22 ENCOUNTER — APPOINTMENT (OUTPATIENT)
Dept: ENDOCRINOLOGY | Facility: CLINIC | Age: 41
End: 2021-11-22

## 2022-01-11 ENCOUNTER — RX RENEWAL (OUTPATIENT)
Age: 42
End: 2022-01-11

## 2022-02-14 ENCOUNTER — APPOINTMENT (OUTPATIENT)
Dept: ENDOCRINOLOGY | Facility: CLINIC | Age: 42
End: 2022-02-14
Payer: COMMERCIAL

## 2022-02-14 VITALS
HEART RATE: 83 BPM | DIASTOLIC BLOOD PRESSURE: 80 MMHG | BODY MASS INDEX: 23.85 KG/M2 | TEMPERATURE: 97.9 F | WEIGHT: 130.4 LBS | OXYGEN SATURATION: 98 % | SYSTOLIC BLOOD PRESSURE: 112 MMHG

## 2022-02-14 DIAGNOSIS — E03.9 HYPOTHYROIDISM, UNSPECIFIED: ICD-10-CM

## 2022-02-14 DIAGNOSIS — E78.5 HYPERLIPIDEMIA, UNSPECIFIED: ICD-10-CM

## 2022-02-14 DIAGNOSIS — R79.89 OTHER SPECIFIED ABNORMAL FINDINGS OF BLOOD CHEMISTRY: ICD-10-CM

## 2022-02-14 LAB — HBA1C MFR BLD HPLC: 5.8

## 2022-02-14 PROCEDURE — 99214 OFFICE O/P EST MOD 30 MIN: CPT | Mod: 25

## 2022-02-14 PROCEDURE — 83036 HEMOGLOBIN GLYCOSYLATED A1C: CPT | Mod: QW

## 2022-02-14 PROCEDURE — 95251 CONT GLUC MNTR ANALYSIS I&R: CPT

## 2022-02-14 RX ORDER — PEN NEEDLE, DIABETIC 29 G X1/2"
32G X 4 MM NEEDLE, DISPOSABLE MISCELLANEOUS
Qty: 1 | Refills: 3 | Status: ACTIVE | COMMUNITY
Start: 2018-08-24 | End: 1900-01-01

## 2022-02-14 NOTE — ASSESSMENT
[FreeTextEntry1] : 41-year-old woman with history of poorly controlled type 1 diabetes, hypothyroidism, hyperlipidemia, low vitamin D level, here for endocrinology follow-up.  Last seen in 2020.\par \par 1.  Type 1 diabetes\par A1c 5.8% today\par With hypoglycemia overnight.\par We will change basal settings from midnight to 6 AM.\par Current regimen:\par Diabetes Therapy Regimen \par Date: 02/14/2022 \par Bolus Insulin: Novolog \par Basal Rate \par Start Time: 12am Basal: 0.5 units/hour ==> 0.45 units/h\par Start Time: 6Am Basal: 0.55 units/hour        \par Carb Ratios \par Start Time: 0000 Carb Ratios: 50 grams/unit         \par Sensitvity \par Start Time: 0000 Sensitivity: 100 mg/dL/U         \par BG Target Ranges \par Start Time: 0000 BG Target Ranges: 120-40 mg/dL         \par last Ophtha was 3 week ago. \par Foot exam normal within a few months. \par \par 2.  Hyperlipidemia\par Check fasting lipid profile\par \par 3.  Hypertension\par BP today 112/80\par eGFR 85 mL/min/1.73M2 7/7/2020\par Microalbumin/creatinine ratio unable to be calculated 7/7/2020\par Creatinine 0.85 mg/dL 7/7/2020\par Repeat blood work today\par \par 4.  Hypothyroidism\par TSH 0.5 uIU/mL 7/7/2020\par Check TFT today adjust dosage as needed.  Currently taking levothyroxine:\par \par \par 5.  Vitamin D deficiency\par Check vitamin D level\par \par Flu vaccine given today.

## 2022-02-14 NOTE — ADDENDUM
[FreeTextEntry1] : ==========\par CGM REPORT:\par ==========\par Patient underwent a CGM study from 2/1/2022–2/14/2022.  14 days of uninterrupted data were downloaded\par The reason for the study was type 1 diabetes\par Pt´s current therapeutic regimen includes: Insulin pump settings as above\par The patient´s blood sugars were in target 62% of the time, above target 13% of the time and below target 11% of the time, and very low below 54 mg/dL 14% of the time\par Average blood sugar was 111 mg/dL\par Glucose Managment Indicator (GMI) 6.0%\par Glucose variability 50.9%\par Defined as percent coefficient of variation (%CV); target <36%\par \par Hypoglycemia: The patient had multiple low glucose events, with average duration of 2 hours 38 minutes to 3 hours to 22 minutes minutes.  None of these were severe (<50mg/dL or requiring assistance). The hypoglycemic events typically overnight and in the morning\par \par Overnight trends appreciated were hypoglycemia overnight\par Daytime trends appreciated were a lot of variability during the day\par \par Recommended therapeutic changes based on CGM Study: Decrease basal insulin setting as above.\par

## 2022-02-14 NOTE — PHYSICAL EXAM
[Alert] : alert [Well Nourished] : well nourished [No Acute Distress] : no acute distress [Well Developed] : well developed [Normal Sclera/Conjunctiva] : normal sclera/conjunctiva [EOMI] : extra ocular movement intact [No Proptosis] : no proptosis [Normal Oropharynx] : the oropharynx was normal [Thyroid Not Enlarged] : the thyroid was not enlarged [No Thyroid Nodules] : no palpable thyroid nodules [No Respiratory Distress] : no respiratory distress [No Accessory Muscle Use] : no accessory muscle use [Normal S1, S2] : normal S1 and S2 [Clear to Auscultation] : lungs were clear to auscultation bilaterally [Normal Rate] : heart rate was normal [Regular Rhythm] : with a regular rhythm [No Edema] : no peripheral edema [Pedal Pulses Normal] : the pedal pulses are present [Normal Bowel Sounds] : normal bowel sounds [Not Tender] : non-tender [Not Distended] : not distended [Soft] : abdomen soft [Normal Anterior Cervical Nodes] : no anterior cervical lymphadenopathy [Normal Posterior Cervical Nodes] : no posterior cervical lymphadenopathy [No Spinal Tenderness] : no spinal tenderness [Spine Straight] : spine straight [Normal Gait] : normal gait [No Stigmata of Cushings Syndrome] : no stigmata of Cushings Syndrome [Normal Strength/Tone] : muscle strength and tone were normal [No Rash] : no rash [Normal Reflexes] : deep tendon reflexes were 2+ and symmetric [No Tremors] : no tremors [Oriented x3] : oriented to person, place, and time [Acanthosis Nigricans] : no acanthosis nigricans

## 2022-02-14 NOTE — HISTORY OF PRESENT ILLNESS
[FreeTextEntry1] : Ms. GEOVANNI BRAY is a 41 year old female here to follow up for Type 1 DM.  Last seen August 2020\par \par Diagnosed at age 16.  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    \par \par Patient also has a  has a history of AVM and seizures.  She was treated with Gamma knife radiation that helped her.\par Following the neurology and on Keppra.  No recent seizures.  \par \par Patient uses OmniPod Dash system.  Setting as below.  She utilizes yecenia sensor.\par She has tried her Dexcom sensor in the past stated that he beeps too much and was driving her crazy.\par \par Regarding hypothyroidism, she's taking LT4 125mcg daily, she reports adherence with her thyroid medication.\par \par Also using Ozempic for weight management, and to improve insulin sensitivity.\par

## 2022-03-09 ENCOUNTER — APPOINTMENT (OUTPATIENT)
Dept: ENDOCRINOLOGY | Facility: CLINIC | Age: 42
End: 2022-03-09

## 2022-04-19 ENCOUNTER — RX RENEWAL (OUTPATIENT)
Age: 42
End: 2022-04-19

## 2022-05-10 ENCOUNTER — RX RENEWAL (OUTPATIENT)
Age: 42
End: 2022-05-10

## 2022-06-08 NOTE — HISTORY OF PRESENT ILLNESS
Spine appears normal, range of motion is not limited, no muscle or joint tenderness. No CVA ttp. [FreeTextEntry1] : Ms. GEOVANNI BRAY is a 40 year old female here to follow up for Type 1 DM. \par Diagnosed at age 16.  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    \par \par Patient also has a  has a history of AVM and seizures she says avm.  She was treated with Gamma knife radiation that helped her.\par Following the neurology and on Keppra.  No recent seizures.  \par \par She uses an insulin pump omnipod (with novolog) and does not like the sensor too much beeping. And does like 2nd device. Uses yecenia on and off.  She also is taking Ozempic 1.0mg once weekly.  Reports no weight benefits. \par \par SMBG 5-6X/day. As per logs, appears to having hypos to 50s after meals mostly. Several BG in 200s-300s, most in good range.\par Changes site every 2 days otherwise has clogs on third day. \par \par She's taking LT4 125mcg daily, she reports adherence with her thyroid medication.  \par  [Continuous Glucose Monitoring] : Continuous Glucose Monitoring: No

## 2022-06-23 ENCOUNTER — RX RENEWAL (OUTPATIENT)
Age: 42
End: 2022-06-23

## 2022-07-12 ENCOUNTER — APPOINTMENT (OUTPATIENT)
Dept: ENDOCRINOLOGY | Facility: CLINIC | Age: 42
End: 2022-07-12

## 2022-09-12 ENCOUNTER — RX RENEWAL (OUTPATIENT)
Age: 42
End: 2022-09-12

## 2022-09-21 ENCOUNTER — RX RENEWAL (OUTPATIENT)
Age: 42
End: 2022-09-21

## 2022-11-09 ENCOUNTER — RX RENEWAL (OUTPATIENT)
Age: 42
End: 2022-11-09

## 2022-12-21 ENCOUNTER — RX RENEWAL (OUTPATIENT)
Age: 42
End: 2022-12-21

## 2023-02-14 ENCOUNTER — RX RENEWAL (OUTPATIENT)
Age: 43
End: 2023-02-14

## 2023-02-14 RX ORDER — SEMAGLUTIDE 1.34 MG/ML
4 INJECTION, SOLUTION SUBCUTANEOUS
Qty: 3 | Refills: 1 | Status: ACTIVE | COMMUNITY
Start: 2018-08-24 | End: 1900-01-01

## 2023-03-01 ENCOUNTER — APPOINTMENT (OUTPATIENT)
Dept: ENDOCRINOLOGY | Facility: CLINIC | Age: 43
End: 2023-03-01

## 2023-03-24 ENCOUNTER — RX RENEWAL (OUTPATIENT)
Age: 43
End: 2023-03-24

## 2023-04-05 ENCOUNTER — APPOINTMENT (OUTPATIENT)
Dept: ENDOCRINOLOGY | Facility: CLINIC | Age: 43
End: 2023-04-05
Payer: COMMERCIAL

## 2023-04-05 LAB
ALBUMIN SERPL ELPH-MCNC: 5.1 G/DL
ALP BLD-CCNC: 84 U/L
ALT SERPL-CCNC: 17 U/L
ANION GAP SERPL CALC-SCNC: 11 MMOL/L
AST SERPL-CCNC: 23 U/L
BILIRUB SERPL-MCNC: 0.4 MG/DL
BUN SERPL-MCNC: 8 MG/DL
CALCIUM SERPL-MCNC: 10.2 MG/DL
CHLORIDE SERPL-SCNC: 101 MMOL/L
CHOLEST SERPL-MCNC: 215 MG/DL
CO2 SERPL-SCNC: 30 MMOL/L
CREAT SERPL-MCNC: 0.7 MG/DL
CREAT SPEC-SCNC: 38 MG/DL
EGFR: 109 ML/MIN/1.73M2
GLUCOSE SERPL-MCNC: 76 MG/DL
HBA1C MFR BLD HPLC: 6.2
HDLC SERPL-MCNC: 111 MG/DL
LDLC SERPL CALC-MCNC: 97 MG/DL
MICROALBUMIN 24H UR DL<=1MG/L-MCNC: <1.2 MG/DL
MICROALBUMIN/CREAT 24H UR-RTO: NORMAL MG/G
NONHDLC SERPL-MCNC: 104 MG/DL
POTASSIUM SERPL-SCNC: 4.6 MMOL/L
PROT SERPL-MCNC: 7.4 G/DL
SODIUM SERPL-SCNC: 142 MMOL/L
TRIGL SERPL-MCNC: 34 MG/DL
TSH SERPL-ACNC: 0.64 UIU/ML

## 2023-04-05 PROCEDURE — 83036 HEMOGLOBIN GLYCOSYLATED A1C: CPT | Mod: QW

## 2023-04-05 PROCEDURE — G0108 DIAB MANAGE TRN  PER INDIV: CPT

## 2023-04-05 RX ORDER — INSULIN PMP CART,AUT,G6/7,CNTR
EACH SUBCUTANEOUS
Qty: 9 | Refills: 3 | Status: ACTIVE | COMMUNITY
Start: 2023-04-05

## 2023-04-05 RX ORDER — INSULIN PMP CART,AUT,G6/7,CNTR
EACH SUBCUTANEOUS
Qty: 1 | Refills: 0 | Status: ACTIVE | COMMUNITY
Start: 2023-04-05

## 2023-04-10 ENCOUNTER — APPOINTMENT (OUTPATIENT)
Dept: ENDOCRINOLOGY | Facility: CLINIC | Age: 43
End: 2023-04-10
Payer: COMMERCIAL

## 2023-04-10 DIAGNOSIS — E10.9 TYPE 1 DIABETES MELLITUS W/OUT COMPLICATIONS: ICD-10-CM

## 2023-04-10 PROCEDURE — G0108 DIAB MANAGE TRN  PER INDIV: CPT

## 2023-04-20 ENCOUNTER — APPOINTMENT (OUTPATIENT)
Dept: OPHTHALMOLOGY | Facility: CLINIC | Age: 43
End: 2023-04-20

## 2023-06-21 ENCOUNTER — RX RENEWAL (OUTPATIENT)
Age: 43
End: 2023-06-21

## 2023-07-13 ENCOUNTER — RX RENEWAL (OUTPATIENT)
Age: 43
End: 2023-07-13

## 2023-07-13 RX ORDER — BLOOD-GLUCOSE METER
32G X 4 MM EACH MISCELLANEOUS
Qty: 1 | Refills: 3 | Status: ACTIVE | COMMUNITY
Start: 2023-07-13 | End: 1900-01-01

## 2023-07-31 ENCOUNTER — APPOINTMENT (OUTPATIENT)
Dept: ENDOCRINOLOGY | Facility: CLINIC | Age: 43
End: 2023-07-31

## 2023-08-01 ENCOUNTER — RX RENEWAL (OUTPATIENT)
Age: 43
End: 2023-08-01

## 2023-08-01 RX ORDER — ROSUVASTATIN CALCIUM 10 MG/1
10 TABLET, FILM COATED ORAL
Qty: 90 | Refills: 1 | Status: ACTIVE | COMMUNITY
Start: 2019-07-30 | End: 1900-01-01

## 2023-09-18 ENCOUNTER — RX RENEWAL (OUTPATIENT)
Age: 43
End: 2023-09-18

## 2023-10-31 ENCOUNTER — RX RENEWAL (OUTPATIENT)
Age: 43
End: 2023-10-31

## 2023-11-11 ENCOUNTER — RX RENEWAL (OUTPATIENT)
Age: 43
End: 2023-11-11

## 2023-11-11 RX ORDER — GLUCAGON 3 MG/1
3 POWDER NASAL
Qty: 2 | Refills: 3 | Status: ACTIVE | COMMUNITY
Start: 2020-07-07 | End: 1900-01-01

## 2024-01-30 ENCOUNTER — RX RENEWAL (OUTPATIENT)
Age: 44
End: 2024-01-30

## 2024-01-30 RX ORDER — ROSUVASTATIN CALCIUM 10 MG/1
10 TABLET, FILM COATED ORAL
Qty: 90 | Refills: 1 | Status: ACTIVE | COMMUNITY
Start: 2023-08-01 | End: 1900-01-01

## 2024-02-13 ENCOUNTER — RX RENEWAL (OUTPATIENT)
Age: 44
End: 2024-02-13

## 2024-02-13 RX ORDER — SYRINGE AND NEEDLE,INSULIN,1ML 28GX1/2"
31G X 5/16" SYRINGE, EMPTY DISPOSABLE MISCELLANEOUS
Qty: 300 | Refills: 1 | Status: ACTIVE | COMMUNITY
Start: 2021-07-16 | End: 1900-01-01

## 2024-02-15 RX ORDER — TIRZEPATIDE 10 MG/.5ML
10 INJECTION, SOLUTION SUBCUTANEOUS
Qty: 1 | Refills: 0 | Status: ACTIVE | COMMUNITY
Start: 2023-04-05 | End: 1900-01-01

## 2024-03-20 ENCOUNTER — RX RENEWAL (OUTPATIENT)
Age: 44
End: 2024-03-20

## 2024-06-26 ENCOUNTER — RX RENEWAL (OUTPATIENT)
Age: 44
End: 2024-06-26

## 2024-07-29 ENCOUNTER — APPOINTMENT (OUTPATIENT)
Dept: ENDOCRINOLOGY | Facility: CLINIC | Age: 44
End: 2024-07-29
Payer: COMMERCIAL

## 2024-07-29 VITALS — BODY MASS INDEX: 23.23 KG/M2 | WEIGHT: 127 LBS

## 2024-07-29 DIAGNOSIS — E10.9 TYPE 1 DIABETES MELLITUS W/OUT COMPLICATIONS: ICD-10-CM

## 2024-07-29 PROCEDURE — G0109 DIAB MANAGE TRN IND/GROUP: CPT

## 2024-07-29 PROCEDURE — 83036 HEMOGLOBIN GLYCOSYLATED A1C: CPT | Mod: QW

## 2024-07-30 LAB — HBA1C MFR BLD HPLC: 6.4

## 2024-07-30 RX ORDER — BLOOD-GLUCOSE SENSOR
EACH MISCELLANEOUS
Qty: 9 | Refills: 3 | Status: ACTIVE | COMMUNITY
Start: 2024-07-29 | End: 1900-01-01

## 2024-07-31 ENCOUNTER — RX RENEWAL (OUTPATIENT)
Age: 44
End: 2024-07-31

## 2024-08-06 ENCOUNTER — NON-APPOINTMENT (OUTPATIENT)
Age: 44
End: 2024-08-06

## 2024-09-23 ENCOUNTER — RX RENEWAL (OUTPATIENT)
Age: 44
End: 2024-09-23

## 2024-10-21 ENCOUNTER — APPOINTMENT (OUTPATIENT)
Dept: ENDOCRINOLOGY | Facility: CLINIC | Age: 44
End: 2024-10-21

## 2024-11-11 RX ORDER — ALCOHOL ANTISEPTIC PADS
PADS, MEDICATED (EA) TOPICAL 3 TIMES DAILY
Qty: 300 | Refills: 0 | Status: ACTIVE | COMMUNITY
Start: 2024-11-11 | End: 1900-01-01

## 2024-11-12 ENCOUNTER — RX RENEWAL (OUTPATIENT)
Age: 44
End: 2024-11-12

## 2025-01-09 ENCOUNTER — RX RENEWAL (OUTPATIENT)
Age: 45
End: 2025-01-09

## 2025-01-13 ENCOUNTER — RX RENEWAL (OUTPATIENT)
Age: 45
End: 2025-01-13

## 2025-01-13 RX ORDER — BLOOD-GLUCOSE SENSOR
EACH MISCELLANEOUS
Qty: 6 | Refills: 3 | Status: ACTIVE | COMMUNITY
Start: 2025-01-13 | End: 1900-01-01

## 2025-01-13 RX ORDER — BLOOD SUGAR DIAGNOSTIC
STRIP MISCELLANEOUS
Qty: 4 | Refills: 1 | Status: ACTIVE | COMMUNITY
Start: 2025-01-13 | End: 1900-01-01

## 2025-02-06 ENCOUNTER — RX RENEWAL (OUTPATIENT)
Age: 45
End: 2025-02-06

## 2025-02-10 ENCOUNTER — RX RENEWAL (OUTPATIENT)
Age: 45
End: 2025-02-10

## 2025-04-27 ENCOUNTER — EMERGENCY (EMERGENCY)
Facility: HOSPITAL | Age: 45
LOS: 1 days | End: 2025-04-27
Attending: STUDENT IN AN ORGANIZED HEALTH CARE EDUCATION/TRAINING PROGRAM | Admitting: STUDENT IN AN ORGANIZED HEALTH CARE EDUCATION/TRAINING PROGRAM
Payer: COMMERCIAL

## 2025-04-27 VITALS
DIASTOLIC BLOOD PRESSURE: 78 MMHG | RESPIRATION RATE: 18 BRPM | TEMPERATURE: 97 F | HEIGHT: 62 IN | OXYGEN SATURATION: 98 % | SYSTOLIC BLOOD PRESSURE: 118 MMHG | HEART RATE: 79 BPM | WEIGHT: 128.09 LBS

## 2025-04-27 VITALS
SYSTOLIC BLOOD PRESSURE: 124 MMHG | OXYGEN SATURATION: 98 % | DIASTOLIC BLOOD PRESSURE: 77 MMHG | HEART RATE: 74 BPM | RESPIRATION RATE: 18 BRPM

## 2025-04-27 DIAGNOSIS — Z92.3 PERSONAL HISTORY OF IRRADIATION: Chronic | ICD-10-CM

## 2025-04-27 LAB
ALBUMIN SERPL ELPH-MCNC: 4.3 G/DL — SIGNIFICANT CHANGE UP (ref 3.3–5)
ALP SERPL-CCNC: 83 U/L — SIGNIFICANT CHANGE UP (ref 40–120)
ALT FLD-CCNC: 29 U/L — SIGNIFICANT CHANGE UP (ref 12–78)
ANION GAP SERPL CALC-SCNC: 2 MMOL/L — LOW (ref 5–17)
AST SERPL-CCNC: 16 U/L — SIGNIFICANT CHANGE UP (ref 15–37)
BASOPHILS # BLD AUTO: 0.1 K/UL — SIGNIFICANT CHANGE UP (ref 0–0.2)
BASOPHILS NFR BLD AUTO: 0.9 % — SIGNIFICANT CHANGE UP (ref 0–2)
BILIRUB SERPL-MCNC: 0.4 MG/DL — SIGNIFICANT CHANGE UP (ref 0.2–1.2)
BUN SERPL-MCNC: 16 MG/DL — SIGNIFICANT CHANGE UP (ref 7–23)
CALCIUM SERPL-MCNC: 10 MG/DL — SIGNIFICANT CHANGE UP (ref 8.5–10.1)
CHLORIDE SERPL-SCNC: 106 MMOL/L — SIGNIFICANT CHANGE UP (ref 96–108)
CO2 SERPL-SCNC: 34 MMOL/L — HIGH (ref 22–31)
CREAT SERPL-MCNC: 0.86 MG/DL — SIGNIFICANT CHANGE UP (ref 0.5–1.3)
EGFR: 84 ML/MIN/1.73M2 — SIGNIFICANT CHANGE UP
EGFR: 84 ML/MIN/1.73M2 — SIGNIFICANT CHANGE UP
EOSINOPHIL # BLD AUTO: 0.25 K/UL — SIGNIFICANT CHANGE UP (ref 0–0.5)
EOSINOPHIL NFR BLD AUTO: 2.2 % — SIGNIFICANT CHANGE UP (ref 0–6)
GLUCOSE SERPL-MCNC: 69 MG/DL — LOW (ref 70–99)
HCG SERPL-ACNC: <1 MIU/ML — SIGNIFICANT CHANGE UP
HCT VFR BLD CALC: 43.2 % — SIGNIFICANT CHANGE UP (ref 34.5–45)
HGB BLD-MCNC: 14.8 G/DL — SIGNIFICANT CHANGE UP (ref 11.5–15.5)
IMM GRANULOCYTES NFR BLD AUTO: 0.4 % — SIGNIFICANT CHANGE UP (ref 0–0.9)
LIDOCAIN IGE QN: 41 U/L — SIGNIFICANT CHANGE UP (ref 13–75)
LYMPHOCYTES # BLD AUTO: 2.69 K/UL — SIGNIFICANT CHANGE UP (ref 1–3.3)
LYMPHOCYTES # BLD AUTO: 23.6 % — SIGNIFICANT CHANGE UP (ref 13–44)
MCHC RBC-ENTMCNC: 29.6 PG — SIGNIFICANT CHANGE UP (ref 27–34)
MCHC RBC-ENTMCNC: 34.3 G/DL — SIGNIFICANT CHANGE UP (ref 32–36)
MCV RBC AUTO: 86.4 FL — SIGNIFICANT CHANGE UP (ref 80–100)
MONOCYTES # BLD AUTO: 0.88 K/UL — SIGNIFICANT CHANGE UP (ref 0–0.9)
MONOCYTES NFR BLD AUTO: 7.7 % — SIGNIFICANT CHANGE UP (ref 2–14)
NEUTROPHILS # BLD AUTO: 7.44 K/UL — HIGH (ref 1.8–7.4)
NEUTROPHILS NFR BLD AUTO: 65.2 % — SIGNIFICANT CHANGE UP (ref 43–77)
NRBC BLD AUTO-RTO: 0 /100 WBCS — SIGNIFICANT CHANGE UP (ref 0–0)
PLATELET # BLD AUTO: 286 K/UL — SIGNIFICANT CHANGE UP (ref 150–400)
POTASSIUM SERPL-MCNC: 4.1 MMOL/L — SIGNIFICANT CHANGE UP (ref 3.5–5.3)
POTASSIUM SERPL-SCNC: 4.1 MMOL/L — SIGNIFICANT CHANGE UP (ref 3.5–5.3)
PROT SERPL-MCNC: 7.6 G/DL — SIGNIFICANT CHANGE UP (ref 6–8.3)
RBC # BLD: 5 M/UL — SIGNIFICANT CHANGE UP (ref 3.8–5.2)
RBC # FLD: 11.5 % — SIGNIFICANT CHANGE UP (ref 10.3–14.5)
SODIUM SERPL-SCNC: 142 MMOL/L — SIGNIFICANT CHANGE UP (ref 135–145)
WBC # BLD: 11.4 K/UL — HIGH (ref 3.8–10.5)
WBC # FLD AUTO: 11.4 K/UL — HIGH (ref 3.8–10.5)

## 2025-04-27 PROCEDURE — 99284 EMERGENCY DEPT VISIT MOD MDM: CPT | Mod: 25

## 2025-04-27 PROCEDURE — 36415 COLL VENOUS BLD VENIPUNCTURE: CPT

## 2025-04-27 PROCEDURE — 80053 COMPREHEN METABOLIC PANEL: CPT

## 2025-04-27 PROCEDURE — 99284 EMERGENCY DEPT VISIT MOD MDM: CPT

## 2025-04-27 PROCEDURE — 96374 THER/PROPH/DIAG INJ IV PUSH: CPT

## 2025-04-27 PROCEDURE — 82962 GLUCOSE BLOOD TEST: CPT

## 2025-04-27 PROCEDURE — 83690 ASSAY OF LIPASE: CPT

## 2025-04-27 PROCEDURE — 84702 CHORIONIC GONADOTROPIN TEST: CPT

## 2025-04-27 PROCEDURE — 85025 COMPLETE CBC W/AUTO DIFF WBC: CPT

## 2025-04-27 RX ORDER — ONDANSETRON HCL/PF 4 MG/2 ML
1 VIAL (ML) INJECTION
Qty: 9 | Refills: 0
Start: 2025-04-27 | End: 2025-04-29

## 2025-04-27 RX ORDER — ONDANSETRON HCL/PF 4 MG/2 ML
1 VIAL (ML) INJECTION
Refills: 0
Start: 2025-04-27

## 2025-04-27 RX ORDER — ONDANSETRON HCL/PF 4 MG/2 ML
4 VIAL (ML) INJECTION ONCE
Refills: 0 | Status: COMPLETED | OUTPATIENT
Start: 2025-04-27 | End: 2025-04-27

## 2025-04-27 RX ADMIN — Medication 4 MILLIGRAM(S): at 03:37

## 2025-04-27 NOTE — ED ADULT NURSE NOTE - ED STAT RN HANDOFF DETAILS
Pt d/c w instructions to f/u w endocrinology. Pt stable, blood sugar holding within in normal limits. Will continue to monitor BG at home

## 2025-04-27 NOTE — ED ADULT NURSE NOTE - ED CARDIAC RATE
Code Marlene called for patient at 0119 for patient becoming violent towards staff, pulling at restraints, kicking and biting. Precedex gtt increased, PRN ativan given. Security up to bedside at this time. normal

## 2025-04-27 NOTE — ED PROVIDER NOTE - PROGRESS NOTE DETAILS
pt seen resting comfortably in stretcher in NAD: F/S have remained wnl. labs are nonactionable. explained results of w/u to pt. told her to watch her sugars very closely at home. told her to f/u urgently with her endo. strict return precautions given. will dc to home

## 2025-04-27 NOTE — ED ADULT NURSE NOTE - OBJECTIVE STATEMENT
Pt arrived to ED c/o hypoglycemia w/ hx DM I. Pt reports she took her Lantus as prescribed 13 units at 2100 hrs tonight. Insulin pump has been disconnected for over 24 hrs but pt reports she takes Novalog based on 1 unit per 12G of carb intake w/ meals. Pt reports tonight at midnight she was alerted that her BGL was 45, took several antihypoglycemics at home as noted in triage note, as instructed by provider. States little improvement. Pt in ED c/o nausea w/ no other complaints at this time. A/O x 4, ambulatory, NAD. Denies CP/SOB/V/D/f/c abd pain or urinary sx. Labs sent, bed in lowest position,  at bedside, safety maintained, nursing care ongoing.

## 2025-04-27 NOTE — ED ADULT TRIAGE NOTE - CHIEF COMPLAINT QUOTE
c/o hypoglycemia; pt is a Type 1 diabetic, starting at approx. midnight pt BGL= 45; pt ate jelly beans and did a nasal glucose spray x 2 with little change to glucose level.. Pt BGL in triage= 78.  Insulin pump off at this time, last took lantus 13 units at approx 2100hrs.

## 2025-04-27 NOTE — ED PROVIDER NOTE - PATIENT PORTAL LINK FT
You can access the FollowMyHealth Patient Portal offered by [facility] by registering at the following website: [email or web address]. By joining [employer]’s FollowMyHealth portal, you will also be able to view your health information using other applications (apps) compatible with our system.

## 2025-04-27 NOTE — ED PROVIDER NOTE - CLINICAL SUMMARY MEDICAL DECISION MAKING FREE TEXT BOX
44F PMH AVM s/p gamma knife, seizures, T1DM on insulin pump, hypothyroidism p/w hypoglycemia. Pt states she is usually on an insulin pump but every now and then she will switch off and go on subcut insulin. She disconnected her insulin pump >24 hrs ago. She has been counting carbs and giving herself novolog when she eats. She gave herself lantus 13u qhs which she normally does when the pump is off. She states her glc was 45 at home; she took intranasal glucagon and jellybeans. F/S currently 78. She came to ED for evaluation. Endorses being nauseous b/c of all the food she ate at home. No fevers, chest pain, SOB, abd pain    Gen: NAD, non-toxic appearing, awake alert   HEENT: EOMI, normal conjunctiva, oral mucosa moist  Lung: CTAB, no respiratory distress, no wheezes/rhonchi/rales B/L, speaking in full sentences  CV: RRR  Abd: soft, NT, ND, no guarding, no rigidity, no rebound tenderness  MSK: no visible deformities, ROM normal in UE/LE  Neuro: No focal sensory or motor deficits, no facial droop  Skin: Warm, well perfused    DDx includes but not limited to: likely high dose of insulin that pt self administered leading to episode of hypoglycemia. However will eval for MEEK, lyte derangements, and monitor for further recurrence  Plan: labs, zofran>PO intake once pt's nausea is improved, reassess symptoms    See Progress Notes for further updates on ED Course

## 2025-04-27 NOTE — ED PROVIDER NOTE - NSFOLLOWUPINSTRUCTIONS_ED_ALL_ED_FT
Follow up with your Endocrinologist in 2-3 days for evaluation of your symptoms    Return to the Emergency Department if you have any new or worsening symptoms Follow up with your Endocrinologist in 2-3 days for evaluation of your symptoms    Keep a close eye on your blood sugars for the next 24 hours    Take Zofran up to three times a day as needed for nausea    Return to the Emergency Department if you have any new or worsening symptoms

## 2025-06-25 ENCOUNTER — APPOINTMENT (OUTPATIENT)
Dept: ENDOCRINOLOGY | Facility: CLINIC | Age: 45
End: 2025-06-25

## 2025-07-22 ENCOUNTER — RX RENEWAL (OUTPATIENT)
Age: 45
End: 2025-07-22

## 2025-07-28 ENCOUNTER — APPOINTMENT (OUTPATIENT)
Dept: ENDOCRINOLOGY | Facility: CLINIC | Age: 45
End: 2025-07-28
Payer: COMMERCIAL

## 2025-07-28 VITALS
WEIGHT: 133 LBS | OXYGEN SATURATION: 98 % | BODY MASS INDEX: 24.48 KG/M2 | HEART RATE: 68 BPM | DIASTOLIC BLOOD PRESSURE: 78 MMHG | HEIGHT: 62 IN | SYSTOLIC BLOOD PRESSURE: 144 MMHG

## 2025-07-28 DIAGNOSIS — E78.5 HYPERLIPIDEMIA, UNSPECIFIED: ICD-10-CM

## 2025-07-28 DIAGNOSIS — E03.9 HYPOTHYROIDISM, UNSPECIFIED: ICD-10-CM

## 2025-07-28 LAB — HBA1C MFR BLD HPLC: 6.7

## 2025-07-28 PROCEDURE — 83036 HEMOGLOBIN GLYCOSYLATED A1C: CPT | Mod: QW

## 2025-07-28 PROCEDURE — 99214 OFFICE O/P EST MOD 30 MIN: CPT

## 2025-07-28 PROCEDURE — G2211 COMPLEX E/M VISIT ADD ON: CPT | Mod: NC

## 2025-07-28 RX ORDER — IBUPROFEN 200 MG
29G X 1/2" TABLET ORAL
Qty: 100 | Refills: 5 | Status: ACTIVE | COMMUNITY
Start: 2025-07-28 | End: 1900-01-01

## 2025-08-18 ENCOUNTER — APPOINTMENT (OUTPATIENT)
Dept: ENDOCRINOLOGY | Facility: CLINIC | Age: 45
End: 2025-08-18
Payer: COMMERCIAL

## 2025-08-18 DIAGNOSIS — E10.9 TYPE 1 DIABETES MELLITUS W/OUT COMPLICATIONS: ICD-10-CM

## 2025-08-18 PROCEDURE — G0108 DIAB MANAGE TRN  PER INDIV: CPT

## 2025-08-18 RX ORDER — TIRZEPATIDE 2.5 MG/.5ML
2.5 INJECTION, SOLUTION SUBCUTANEOUS
Qty: 1 | Refills: 1 | Status: ACTIVE | COMMUNITY
Start: 2025-07-28

## 2025-08-18 RX ORDER — BLOOD-GLUCOSE SENSOR
EACH MISCELLANEOUS
Qty: 9 | Refills: 3 | Status: ACTIVE | COMMUNITY
Start: 2025-08-18